# Patient Record
Sex: MALE
[De-identification: names, ages, dates, MRNs, and addresses within clinical notes are randomized per-mention and may not be internally consistent; named-entity substitution may affect disease eponyms.]

---

## 2022-07-07 ENCOUNTER — RX ONLY (OUTPATIENT)
Age: 57
Setting detail: RX ONLY
End: 2022-07-07

## 2022-07-07 ENCOUNTER — APPOINTMENT (RX ONLY)
Dept: URBAN - METROPOLITAN AREA CLINIC 158 | Facility: CLINIC | Age: 57
Setting detail: DERMATOLOGY
End: 2022-07-07

## 2022-07-07 DIAGNOSIS — B37.2 CANDIDIASIS OF SKIN AND NAIL: ICD-10-CM

## 2022-07-07 DIAGNOSIS — L259 CONTACT DERMATITIS AND OTHER ECZEMA, UNSPECIFIED CAUSE: ICD-10-CM

## 2022-07-07 PROBLEM — L23.9 ALLERGIC CONTACT DERMATITIS, UNSPECIFIED CAUSE: Status: ACTIVE | Noted: 2022-07-07

## 2022-07-07 PROCEDURE — ? PATIENT SPECIFIC COUNSELING

## 2022-07-07 PROCEDURE — ? PRESCRIPTION

## 2022-07-07 PROCEDURE — ? COUNSELING

## 2022-07-07 PROCEDURE — 99202 OFFICE O/P NEW SF 15 MIN: CPT

## 2022-07-07 PROCEDURE — ? TREATMENT REGIMEN

## 2022-07-07 PROCEDURE — ? ORDER TESTS

## 2022-07-07 RX ORDER — HYDROCORTISONE 25 MG/G
OINTMENT TOPICAL
Qty: 20 | Refills: 0 | Status: ERX | COMMUNITY
Start: 2022-07-07

## 2022-07-07 RX ORDER — HYDROCORTISONE 25 MG/G
CREAM TOPICAL
Qty: 20 | Refills: 0 | Status: ERX | COMMUNITY
Start: 2022-07-07

## 2022-07-07 RX ADMIN — HYDROCORTISONE 1: 25 CREAM TOPICAL at 00:00

## 2022-07-07 ASSESSMENT — LOCATION SIMPLE DESCRIPTION DERM
LOCATION SIMPLE: RIGHT LIP
LOCATION SIMPLE: UPPER LIP

## 2022-07-07 ASSESSMENT — LOCATION DETAILED DESCRIPTION DERM
LOCATION DETAILED: RIGHT UPPER CUTANEOUS LIP
LOCATION DETAILED: PHILTRUM

## 2022-07-07 ASSESSMENT — LOCATION ZONE DERM: LOCATION ZONE: LIP

## 2022-07-07 NOTE — HPI: RASH
What Type Of Note Output Would You Prefer (Optional)?: Bullet Format
Is This A New Presentation, Or A Follow-Up?: Rash
Additional History: Patient went to urgent care in [place] about 3 weeks ago and was given a topical ointment and oral antibiotic, unsure of names. He did receive some relief but came right back as soon as he d/c medications.

## 2022-07-07 NOTE — PROCEDURE: TREATMENT REGIMEN
Detail Level: Zone
Plan: Apply hydrocortisone 2.5%, Switch to children’s fruity toothpaste, use salt water for mouthwash, use zinc stick sunblock for a lip balm; also do not drink electrolyte water from wide mouth container; instead transfer to smaller water bottle or drink with straw.\\n\\nHx: upper cutaneous lip and corners of mouth became inflamed 4-6 weeks ago; went to urgent care 2 weeks ago and given oral antibiotic 2x/d and he believes mupirocin ointment 3x/d for 1 week. Better but not clear. Still red on mustache area.\\nProducts which might get on this area: toothpaste, unlikely but possible mouthwash, lip balm with sunscreen, and various products his wife gave him to to soothe the inflammation. 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 Med: s/p back surgery years ago, still requires pain med for this; low dose Effexor for anxiety for years

## 2022-07-07 NOTE — PROCEDURE: ORDER TESTS
Billing Type: Third-Party Bill
Expected Date Of Service: 07/07/2022
Lab Facility: [id number]
Bill For Surgical Tray: no
Performing Laboratory: [id number]

## 2022-07-21 ENCOUNTER — APPOINTMENT (RX ONLY)
Dept: URBAN - METROPOLITAN AREA CLINIC 158 | Facility: CLINIC | Age: 57
Setting detail: DERMATOLOGY
End: 2022-07-21

## 2022-07-21 DIAGNOSIS — L259 CONTACT DERMATITIS AND OTHER ECZEMA, UNSPECIFIED CAUSE: ICD-10-CM | Status: RESOLVED

## 2022-07-21 PROBLEM — L23.9 ALLERGIC CONTACT DERMATITIS, UNSPECIFIED CAUSE: Status: ACTIVE | Noted: 2022-07-21

## 2022-07-21 PROCEDURE — 99213 OFFICE O/P EST LOW 20 MIN: CPT

## 2022-07-21 PROCEDURE — ? TREATMENT REGIMEN

## 2022-07-21 PROCEDURE — ? COUNSELING

## 2022-07-21 ASSESSMENT — LOCATION SIMPLE DESCRIPTION DERM: LOCATION SIMPLE: UPPER LIP

## 2022-07-21 ASSESSMENT — LOCATION ZONE DERM: LOCATION ZONE: LIP

## 2022-07-21 ASSESSMENT — LOCATION DETAILED DESCRIPTION DERM: LOCATION DETAILED: PHILTRUM

## 2022-07-21 NOTE — PROCEDURE: TREATMENT REGIMEN
Detail Level: Zone
Plan: LOV:\\nApply hydrocortisone 2.5%, Switch to children’s fruity toothpaste, use salt water for mouthwash, use zinc stick sunblock for a lip balm; also do not drink electrolyte water from wide mouth container; instead transfer to smaller water bottle or drink with straw.\\n\\nHx: upper cutaneous lip and corners of mouth became inflamed 4-6 weeks ago; went to urgent care 2 weeks ago and given oral antibiotic 2x/d and he believes mupirocin ointment 3x/d for 1 week. Better but not clear. Still red on mustache area.\\nProducts which might get on this area: toothpaste, unlikely but possible mouthwash, lip balm with sunscreen, and various products his wife gave him to to soothe the inflammation. 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 Med: s/p back surgery years ago, still requires pain med for this; low dose Effexor for anxiety for years.\\n\\n7/21/2022: clear. May add back 1 thing q 2 weeks of the things we eliminated at his last visit. If upper lip dermatitis recurs, he will know he is allergic to this thing